# Patient Record
Sex: MALE
[De-identification: names, ages, dates, MRNs, and addresses within clinical notes are randomized per-mention and may not be internally consistent; named-entity substitution may affect disease eponyms.]

---

## 2019-10-17 ENCOUNTER — HOSPITAL ENCOUNTER (EMERGENCY)
Dept: HOSPITAL 97 - ER | Age: 34
Discharge: HOME | End: 2019-10-17
Payer: COMMERCIAL

## 2019-10-17 VITALS — DIASTOLIC BLOOD PRESSURE: 102 MMHG | TEMPERATURE: 98.2 F | OXYGEN SATURATION: 98 % | SYSTOLIC BLOOD PRESSURE: 143 MMHG

## 2019-10-17 DIAGNOSIS — J40: Primary | ICD-10-CM

## 2019-10-17 PROCEDURE — 99284 EMERGENCY DEPT VISIT MOD MDM: CPT

## 2019-10-17 PROCEDURE — 71046 X-RAY EXAM CHEST 2 VIEWS: CPT

## 2019-10-17 PROCEDURE — 94640 AIRWAY INHALATION TREATMENT: CPT

## 2019-10-17 NOTE — RAD REPORT
EXAM DESCRIPTION:  RAD - Chest Pa And Lat (2 Views) - 10/17/2019 11:10 am

 

CLINICAL HISTORY:  Cough;Congestion, fever

 

COMPARISON:  None.

 

TECHNIQUE:  PA and lateral views of the chest were obtained.

 

FINDINGS:  The lungs are clear of focal infiltrate. Perihilar markings are not outside of range of no
rmal.   Heart size is normal and central vasculature is within normal limits.  No pleural effusion or
 pneumothorax seen.  No acute bony finding noted.  No aortic abnormality.

 

IMPRESSION:  No acute cardiopulmonary process.

## 2019-10-17 NOTE — ER
Nurse's Notes                                                                                     

 [facility]                                                                 

Name: Melany Holman                                                                             

Age: 34 yrs                                                                                       

Sex: Male                                                                                         

: 1985                                                                                   

MRN: [id number]                                                                                   

Arrival Date: 10/17/2019                                                                          

Time: 09:32                                                                                       

Account#: [id number]                                                                            

Bed 15                                                                                            

Private MD:                                                                                       

Diagnosis: Bronchitis, not specified as acute or chronic                                          

                                                                                                  

Presentation:                                                                                     

10/17                                                                                             

09:46 Presenting complaint: Patient states: chest congestion and tightness X 2 days, fever at   

      home on Oct 4, was seen at doctor and prescribed abx for bronchitis, f/u with doctor at     

      clinic yesterday, doctor heard something on left side of chest was told he needed CXR,      

      dry cough. Transition of care: patient was not received from another setting of care.       

      Onset of symptoms was 2019. Risk Assessment: Do you want to hurt yourself       

      or someone else? Patient reports no desire to harm self or others. Initial Sepsis           

      Screen: Does the patient meet any 2 criteria? No. Patient's initial sepsis screen is        

      negative. Does the patient have a suspected source of infection? No. Patient's initial      

      sepsis screen is negative. Care prior to arrival: None.                                     

09:46 Method Of Arrival: Ambulatory                                                             

09:46 Acuity: NADYA 3                                                                           iw  

                                                                                                  

Historical:                                                                                       

- Allergies:                                                                                      

09:50 No Known Allergies;                                                                     iw  

- Home Meds:                                                                                      

09:50 None [Active];                                                                          iw  

- PMHx:                                                                                           

09:50 None;                                                                                   iw  

- PSHx:                                                                                           

09:50 None;                                                                                   iw  

                                                                                                  

- Immunization history:: Adult Immunizations not up to date.                                      

- Social history:: Smoking status: Patient/guardian denies using tobacco.                         

- Ebola Screening: : Patient negative for fever greater than or equal to 101.5 degrees            

  Fahrenheit, and additional compatible Ebola Virus Disease symptoms Patient denies               

  exposure to infectious person Patient denies travel to an Ebola-affected area in the            

  21 days before illness onset No symptoms or risks identified at this time.                      

                                                                                                  

                                                                                                  

Screening:                                                                                        

10:00 Abuse screen: Denies threats or abuse. Denies injuries from another. Nutritional        ph  

      screening: No deficits noted. Tuberculosis screening: No symptoms or risk factors           

      identified. Fall Risk None identified.                                                      

                                                                                                  

Assessment:                                                                                       

10:00 General: Appears in no apparent distress. comfortable, well groomed, Behavior is calm,  ph  

      cooperative, appropriate for age, Denies fever. Pain: Denies pain. Neuro: Level of          

      Consciousness is awake, alert, obeys commands, Oriented to person, place, time,             

      situation. Cardiovascular: Capillary refill < 3 seconds in bilateral fingers Patient's      

      skin is warm and dry. Rhythm is regular. Respiratory: Reports cough that is Airway is       

      patent Respiratory effort is even, unlabored, Respiratory pattern is regular,               

      symmetrical, Breath sounds are coarse in left posterior lower lobe. GI: No signs and/or     

      symptoms were reported involving the gastrointestinal system. Derm: Skin is intact, is      

      healthy with good turgor, Skin is pink, warm \T\ dry. Musculoskeletal: Circulation,         

      motion, and sensation intact. Range of motion: intact in all extremities.                   

12:08 Reassessment: Patient appears in no apparent distress at this time. Patient and/or      ph  

      family updated on plan of care and expected duration. Pain level reassessed. Patient is     

      alert, oriented x 3, equal unlabored respirations, skin warm/dry/pink. Pt d/c home.         

                                                                                                  

Vital Signs:                                                                                      

09:50  / 102; Pulse 100; Resp 18; Temp 98.2(O); Pulse Ox 98% on R/A; Weight 72.57 kg;   iw  

      Height 5 ft. 7 in. (170.18 cm);                                                             

11:30  / 89; Pulse 87; Resp 18; Temp 97.9; Pulse Ox 99% on R/A;                         ph  

09:50 Body Mass Index 25.06 (72.57 kg, 170.18 cm)                                             iw  

                                                                                                  

ED Course:                                                                                        

09:32 Patient arrived in ED.                                                                  mr  

09:41 Luz Kwok[facility]                                                        kb  

09:41 Xavi Mcpherson MD is Attending Physician.                                              kb  

09:50 Triage completed.                                                                       iw  

09:50 Arm band placed on.                                                                     iw  

09:55 Rhonda Orozco, RN is Primary Nurse.                                                    ph  

10:30 Patient has correct armband on for positive identification. Placed in gown. Bed in low  ph  

      position. Call light in reach. Side rails up X 1. Pulse ox on. NIBP on. Door closed.        

      Noise minimized. Warm blanket given.                                                        

11:10 Chest Pa And Lat (2 Views) XRAY In Process Unspecified.                                 EDMS

12:11 No provider procedures requiring assistance completed. Patient did not have IV access   ph  

      during this emergency room visit.                                                           

                                                                                                  

Administered Medications:                                                                         

10:23 Drug: DuoNeb (3:1) (2.5 mg - 0.5 mg) 3 ml Route: Nebulizer;                             ph  

10:47 Follow up: Response: No adverse reaction                                                ph  

                                                                                                  

                                                                                                  

Outcome:                                                                                          

11:54 Discharge ordered by MD.                                                                jet  

12:11 Discharged to home ambulatory, with significant other.                                  ph  

12:11 Condition: good                                                                             

12:11 Discharge instructions given to patient, Instructed on discharge instructions, follow       

      up and referral plans. Demonstrated understanding of instructions, follow-up care.          

12:12 Patient left the ED.                                                                    ph  

                                                                                                  

Signatures:                                                                                       

Dispatcher [employer]                           EDLuz Jacobs, JESENIA KAPLAN-Lakeisha Langford Irene, RN                     RN   iw                                                   

Rhonda Orozco RN                      RN   ph                                                   

                                                                                                  

**************************************************************************************************

## 2019-10-17 NOTE — EDPHYS
Physician Documentation                                                                           

 [facility]                                                                 

Name: Melany Holman                                                                             

Age: 34 yrs                                                                                       

Sex: Male                                                                                         

: 1985                                                                                   

MRN: [id number]                                                                                   

Arrival Date: 10/17/2019                                                                          

Time: 09:32                                                                                       

Account#: [id number]                                                                            

Bed 15                                                                                            

Private MD:                                                                                       

ED Physician Xavi Mcpherson                                                                       

HPI:                                                                                              

10/17                                                                                             

11:33 This 34 yrs old  Male presents to ER via Ambulatory with complaints of cough.   kb  

11:33 The patient or guardian reports cough, that is intermittent, described as mild, with no kb  

      sputum, flu symptoms, low-grade fever, myalgias. Onset: The symptoms/episode                

      began/occurred 2 week(s) ago. Severity of symptoms: At their worst the symptoms were        

      moderate, in the emergency department the symptoms have improved, moderately. Modifying     

      factors: The symptoms are alleviated by nothing, the symptoms are aggravated by             

      nothing. Associated signs and symptoms: The patient has no apparent associated signs or     

      symptoms. The patient has not experienced similar symptoms in the past. The patient has     

      not recently seen a physician. Pt reports he started with cough, congestion and fever       

      on 10/3/19. Went to the clinic on the third day of symptoms and was given 10 day course     

      of augmentin. States he finished that yesterday and went back to the clinic for follow      

      up. The dr that he saw yesterday told him he needed to come for a chest x-ray because       

      she heard some bacteria in his left lung.                                                   

                                                                                                  

Historical:                                                                                       

- Allergies:                                                                                      

09:50 No Known Allergies;                                                                     iw  

- Home Meds:                                                                                      

09:50 None [Active];                                                                          iw  

- PMHx:                                                                                           

09:50 None;                                                                                   iw  

- PSHx:                                                                                           

09:50 None;                                                                                   iw  

                                                                                                  

- Immunization history:: Adult Immunizations not up to date.                                      

- Social history:: Smoking status: Patient/guardian denies using tobacco.                         

- Ebola Screening: : Patient negative for fever greater than or equal to 101.5 degrees            

  Fahrenheit, and additional compatible Ebola Virus Disease symptoms Patient denies               

  exposure to infectious person Patient denies travel to an Ebola-affected area in the            

  21 days before illness onset No symptoms or risks identified at this time.                      

                                                                                                  

                                                                                                  

ROS:                                                                                              

11:32 Constitutional: Negative for fever, chills, and weight loss, ENT: Negative for injury,  kb  

      pain, and discharge, Neck: Negative for injury, pain, and swelling, Cardiovascular:         

      Negative for chest pain, palpitations, and edema, Abdomen/GI: Negative for abdominal        

      pain, nausea, vomiting, diarrhea, and constipation, Back: Negative for injury and pain,     

      : Negative for injury, bleeding, discharge, and swelling, MS/Extremity: Negative for      

      injury and deformity, Skin: Negative for injury, rash, and discoloration, Neuro:            

      Negative for headache, weakness, numbness, tingling, and seizure.                           

11:32 Respiratory: Positive for cough, Negative for dyspnea on exertion, hemoptysis,              

      orthopnea, pleurisy, shortness of breath, sputum production, wheezing.                      

                                                                                                  

Exam:                                                                                             

11:32 Constitutional:  This is a well developed, well nourished patient who is awake, alert,  kb  

      and in no acute distress. Head/Face:  Normocephalic, atraumatic. ENT:  Nares patent. No     

      nasal discharge, no septal abnormalities noted.  Tympanic membranes are normal and          

      external auditory canals are clear.  Oropharynx with no redness, swelling, or masses,       

      exudates, or evidence of obstruction, uvula midline.  Mucous membranes moist. Neck:         

      Trachea midline, no thyromegaly or masses palpated, and no cervical lymphadenopathy.        

      Supple, full range of motion without nuchal rigidity, or vertebral point tenderness.        

      No Meningismus. Chest/axilla:  Normal chest wall appearance and motion.  Nontender with     

      no deformity.  No lesions are appreciated. Cardiovascular:  Regular rate and rhythm         

      with a normal S1 and S2.  No gallops, murmurs, or rubs.  Normal PMI, no JVD.  No pulse      

      deficits. Abdomen/GI:  Soft, non-tender, with normal bowel sounds.  No distension or        

      tympany.  No guarding or rebound.  No evidence of tenderness throughout. Back:  No          

      spinal tenderness.  No costovertebral tenderness.  Full range of motion. Skin:  Warm,       

      dry with normal turgor.  Normal color with no rashes, no lesions, and no evidence of        

      cellulitis. MS/ Extremity:  Pulses equal, no cyanosis.  Neurovascular intact.  Full,        

      normal range of motion. Neuro:  Awake and alert, GCS 15, oriented to person, place,         

      time, and situation.  Cranial nerves II-XII grossly intact.  Motor strength 5/5 in all      

      extremities.  Sensory grossly intact.  Cerebellar exam normal.  Normal gait.                

11:32 Respiratory: the patient does not display signs of respiratory distress,  Respirations:     

      normal, Breath sounds: rhonchi, that are mild, are heard in the  left lower lobe and        

      left posterior lower lobe.                                                                  

                                                                                                  

Vital Signs:                                                                                      

09:50  / 102; Pulse 100; Resp 18; Temp 98.2(O); Pulse Ox 98% on R/A; Weight 72.57 kg;   iw  

      Height 5 ft. 7 in. (170.18 cm);                                                             

11:30  / 89; Pulse 87; Resp 18; Temp 97.9; Pulse Ox 99% on R/A;                         ph  

09:50 Body Mass Index 25.06 (72.57 kg, 170.18 cm)                                             iw  

                                                                                                  

MDM:                                                                                              

09:50 Patient medically screened.                                                             kb  

11:32 Data reviewed: vital signs, nurses notes. Data interpreted: Pulse oximetry: on room air kb  

      is 98 %. Interpretation: normal. Test interpretation: by ED physician or midlevel           

      provider: plain radiologic studies, neg .                                                   

11:54 Counseling: I had a detailed discussion with the patient and/or guardian regarding: the kb  

      historical points, exam findings, and any diagnostic results supporting the                 

      discharge/admit diagnosis, radiology results, the need for outpatient follow up, a          

      family practitioner, to return to the emergency department if symptoms worsen or            

      persist or if there are any questions or concerns that arise at home.                       

                                                                                                  

10/17                                                                                             

10:09 Order name: Chest Pa And Lat (2 Views) XRAY; Complete Time: 11:55                       kb  

                                                                                                  

Administered Medications:                                                                         

10:23 Drug: DuoNeb (3:1) (2.5 mg - 0.5 mg) 3 ml Route: Nebulizer;                             ph  

10:47 Follow up: Response: No adverse reaction                                                ph  

                                                                                                  

                                                                                                  

Disposition:                                                                                      

10/17/19 11:54 Discharged to Home. Impression: Bronchitis, not specified as acute or chronic.     

- Condition is Stable.                                                                            

- Discharge Instructions: Acute Bronchitis, Easy-to-Read, Viral Respiratory Infection,            

  Easy-To-Read.                                                                                   

- Prescriptions for Albuterol Sulfate 90 mcg/actuation - inhale 1-2 puff by INHALATION            

  route every 4-6 hours; 1 Inhaler.                                                               

- Medication Reconciliation Form, Thank You Letter, Antibiotic Education, Prescription            

  Opioid Use, Work release form form.                                                             

- Follow up: Private Physician; When: 2 - 3 days; Reason: Recheck today's complaints,             

  Continuance of care, Re-evaluation by your physician. Follow up: Emergency                      

  Department; When: As needed.                                                                    

                                                                                                  

                                                                                                  

                                                                                                  

Addendum:                                                                                         

10/18/2019                                                                                        

     15:28 Co-signature as Attending Physician, Xavi Mcpherson MD.                                  g
s

                                                                                                  

Signatures:                                                                                       

Dispatcher [employer]                           EDLuz Jacobs, FNP-C                 FNP-Ckb                                                   

Kirti Fontenot, RN                     RN   iw                                                   

Rhonda Orozco RN                      RN   slim Mcpherson, MD HAZEL Hurley                                                      

                                                                                                  

Corrections: (The following items were deleted from the chart)                                    

10/17                                                                                             

12:12 11:54 10/17/2019 11:54 Discharged to Home. Impression: Bronchitis, not specified as     ph  

      acute or chronic. Condition is Stable. Forms are Medication Reconciliation Form, Thank      

      You Letter, Antibiotic Education, Prescription Opioid Use. Follow up: Private               

      Physician; When: 2 - 3 days; Reason: Recheck today's complaints, Continuance of care,       

      Re-evaluation by your physician. Follow up: Emergency Department; When: As needed. kb       

                                                                                                  

**************************************************************************************************

## 2020-05-25 ENCOUNTER — HOSPITAL ENCOUNTER (EMERGENCY)
Dept: HOSPITAL 97 - ER | Age: 35
Discharge: HOME | End: 2020-05-25
Payer: COMMERCIAL

## 2020-05-25 VITALS — SYSTOLIC BLOOD PRESSURE: 131 MMHG | OXYGEN SATURATION: 98 % | DIASTOLIC BLOOD PRESSURE: 88 MMHG

## 2020-05-25 VITALS — TEMPERATURE: 99.1 F

## 2020-05-25 DIAGNOSIS — R07.9: Primary | ICD-10-CM

## 2020-05-25 LAB
ALBUMIN SERPL BCP-MCNC: 3.9 G/DL (ref 3.4–5)
ALP SERPL-CCNC: 77 U/L (ref 45–117)
ALT SERPL W P-5'-P-CCNC: 44 U/L (ref 12–78)
AST SERPL W P-5'-P-CCNC: 18 U/L (ref 15–37)
BUN BLD-MCNC: 12 MG/DL (ref 7–18)
GLUCOSE SERPLBLD-MCNC: 108 MG/DL (ref 74–106)
HCT VFR BLD CALC: 51.4 % (ref 39.6–49)
INR BLD: 1.03
LYMPHOCYTES # SPEC AUTO: 1.9 K/UL (ref 0.7–4.9)
MAGNESIUM SERPL-MCNC: 2.2 MG/DL (ref 1.8–2.4)
NT-PROBNP SERPL-MCNC: 5 PG/ML (ref ?–125)
PMV BLD: 10 FL (ref 7.6–11.3)
POTASSIUM SERPL-SCNC: 4 MMOL/L (ref 3.5–5.1)
RBC # BLD: 5.69 M/UL (ref 4.33–5.43)
TROPONIN (EMERG DEPT USE ONLY): < 0.02 NG/ML (ref 0–0.04)

## 2020-05-25 PROCEDURE — 83880 ASSAY OF NATRIURETIC PEPTIDE: CPT

## 2020-05-25 PROCEDURE — 85025 COMPLETE CBC W/AUTO DIFF WBC: CPT

## 2020-05-25 PROCEDURE — 80048 BASIC METABOLIC PNL TOTAL CA: CPT

## 2020-05-25 PROCEDURE — 80076 HEPATIC FUNCTION PANEL: CPT

## 2020-05-25 PROCEDURE — 36415 COLL VENOUS BLD VENIPUNCTURE: CPT

## 2020-05-25 PROCEDURE — 85610 PROTHROMBIN TIME: CPT

## 2020-05-25 PROCEDURE — 84484 ASSAY OF TROPONIN QUANT: CPT

## 2020-05-25 PROCEDURE — 99285 EMERGENCY DEPT VISIT HI MDM: CPT

## 2020-05-25 PROCEDURE — 71045 X-RAY EXAM CHEST 1 VIEW: CPT

## 2020-05-25 PROCEDURE — 93005 ELECTROCARDIOGRAM TRACING: CPT

## 2020-05-25 PROCEDURE — 83735 ASSAY OF MAGNESIUM: CPT

## 2020-05-25 NOTE — RAD REPORT
EXAM DESCRIPTION:  Bharath Single View5/25/2020 11:21 am

 

CLINICAL HISTORY:  Chest pain

 

COMPARISON:  2019

 

FINDINGS:   The lungs appear clear of acute infiltrate. The heart is normal size

 

IMPRESSION:   No acute abnormalities displayed

## 2020-05-26 NOTE — EKG
Test Date:    2020-05-25               Test Time:    11:08:01

Technician:   REINIER                                    

                                                     

MEASUREMENT RESULTS:                                       

Intervals:                                           

Rate:         69                                     

KS:           138                                    

QRSD:         86                                     

QT:           362                                    

QTc:          387                                    

Axis:                                                

P:            51                                     

KS:           138                                    

QRS:          50                                     

T:            23                                     

                                                     

INTERPRETIVE STATEMENTS:                                       

                                                     

Normal sinus rhythm with sinus arrhythmia

Nonspecific T wave abnormality

Abnormal ECG

No previous ECG available for comparison



Electronically Signed On 05-26-20 07:08:07 CDT by Nelson Mohan

## 2020-06-01 NOTE — EDPHYS
Physician Documentation                                                                           

 [facility]                                                                 

Name: Melany Holman                                                                             

Age: 34 yrs                                                                                       

Sex: Male                                                                                         

: 1985                                                                                   

MRN: [id number]                                                                                   

Arrival Date: 2020                                                                          

Time: 10:36                                                                                       

Account#: [id number]                                                                            

Bed 16                                                                                            

Private MD:                                                                                       

ED Physician Kenney Mackay                                                                         

HPI:                                                                                              

                                                                                             

11:03 This 34 yrs old  Male presents to ER via Ambulatory with complaints of Chest    pm1 

      Pain, Back Pain, Numbness Of Arm.                                                           

11:03 The patient or guardian reports chest pain that is located primarily in the left        pm1 

      clavicle and anterior aspect of left upper chest. The pain radiates to the left arm,        

      left back. Associated signs and symptoms: Pertinent positives: shortness of breath,         

      Pertinent negatives: abdominal pain, cough, nausea, vomiting. The chest pain is             

      described as sharp. Duration: The patient or guardian reports a single episode, that is     

      now resolved. Severity of pain: in the emergency department the pain has resolved. The      

      patient has not experienced similar symptoms in the past. onset 3-4 days ago.               

                                                                                                  

Historical:                                                                                       

- Allergies:                                                                                      

10:50 No Known Allergies;                                                                     aa5 

- PMHx:                                                                                           

10:50 None;                                                                                   aa5 

- PSHx:                                                                                           

10:50 lasik eye sx;                                                                           aa5 

                                                                                                  

- Immunization history:: Adult Immunizations unknown.                                             

- Social history:: Smoking status: unknown.                                                       

                                                                                                  

                                                                                                  

ROS:                                                                                              

11:03 Constitutional: Negative for fever, chills, and weight loss.                            pm1 

11:03 Abdomen/GI: Negative for abdominal pain, nausea, vomiting, diarrhea, and constipation,      

      MS/Extremity: Negative for injury and deformity, Skin: Negative for injury, rash, and       

      discoloration.                                                                              

11:03 Neuro: Negative for headache, weakness, numbness, tingling, and seizure.                    

11:03 Cardiovascular: Positive for chest pain, Negative for edema, palpitations.                  

11:03 Respiratory: Positive for shortness of breath, Negative for cough, wheezing.                

11:03 Back: Positive for pain left trapezius.                                                     

                                                                                                  

Exam:                                                                                             

11:03 Constitutional:  This is a well developed, well nourished patient who is awake, alert,  pm1 

      and in no acute distress. Head/Face:  Normocephalic, atraumatic. Neck:  Trachea             

      midline, no thyromegaly or masses palpated, and no cervical lymphadenopathy.  Supple,       

      full range of motion without nuchal rigidity, or vertebral point tenderness.  No            

      Meningismus. Chest/axilla:  Normal chest wall appearance and motion.  Nontender with no     

      deformity.  No lesions are appreciated. Cardiovascular:  Regular rate and rhythm with a     

      normal S1 and S2.  No gallops, murmurs, or rubs.  Normal PMI, no JVD.  No pulse             

      deficits. Respiratory:  Lungs have equal breath sounds bilaterally, clear to                

      auscultation and percussion.  No rales, rhonchi or wheezes noted.  No increased work of     

      breathing, no retractions or nasal flaring. Abdomen/GI:  Soft, non-tender, with normal      

      bowel sounds.  No distension or tympany.  No guarding or rebound.  No evidence of           

      tenderness throughout. Back:  No spinal tenderness.  No costovertebral tenderness.          

      Full range of motion. Skin:  Warm, dry with normal turgor.  Normal color with no            

      rashes, no lesions, and no evidence of cellulitis. MS/ Extremity:  Pulses equal, no         

      cyanosis.  Neurovascular intact.  Full, normal range of motion.                             

11:03 Neuro: Exam negative for acute changes, Orientation: is normal, Mentation: is normal,       

      Motor: is normal, moves all fours.                                                          

                                                                                                  

Vital Signs:                                                                                      

10:50  / 87; Pulse 73; Resp 16 S; Temp 99.1(O); Pulse Ox 100% on R/A; Pain 0/10;        aa5 

12:06  / 85; Pulse 75; Resp 18; Pulse Ox 99% ;                                          bp  

13:07  / 88; Pulse 72; Resp 15; Pulse Ox 98% ;                                          bp  

                                                                                                  

MDM:                                                                                              

10:50 Patient medically screened.                                                             pm1 

12:30 Data reviewed: vital signs. Data interpreted: Pulse oximetry: on room air is 99 %.      pm1 

      Interpretation: normal. Counseling: I had a detailed discussion with the patient and/or     

      guardian regarding: the historical points, exam findings, and any diagnostic results        

      supporting the discharge/admit diagnosis, lab results, radiology results, the need for      

      outpatient follow up, to return to the emergency department if symptoms worsen or           

      persist or if there are any questions or concerns that arise at home.                       

                                                                                                  

                                                                                             

10:58 Order name: Basic Metabolic Panel; Complete Time: 11:53                                 pm1 

                                                                                             

10:58 Order name: CBC with Diff; Complete Time: 11:38                                         pm1 

                                                                                             

10:58 Order name: LFT's; Complete Time: 11:53                                                 pm                                                                                             

10:58 Order name: Magnesium; Complete Time: 11:53                                             pm                                                                                             

10:58 Order name: NT PRO-BNP; Complete Time: 11:53                                            pm                                                                                             

10:58 Order name: PT-INR; Complete Time: 11:52                                                pm                                                                                             

10:58 Order name: Troponin (emerg Dept Use Only); Complete Time: 11:53                        pm                                                                                             

10:58 Order name: XRAY Chest (1 view); Complete Time: 11:52                                   pm                                                                                             

10:58 Order name: EKG; Complete Time: 11:01                                                   pm                                                                                             

10:58 Order name: Cardiac monitoring; Complete Time: 11:04                                    pm                                                                                             

10:58 Order name: EKG - Nurse/Tech; Complete Time: 11:                                      pm                                                                                             

10:58 Order name: IV Saline Lock; Complete Time: 11:25                                        pm                                                                                             

10:58 Order name: Labs collected and sent; Complete Time: 11:                               pm                                                                                             

10:58 Order name: O2 Per Protocol; Complete Time: 11:10                                       pm                                                                                             

10:58 Order name: O2 Sat Monitoring; Complete Time: 11:10                                     pm 

                                                                                                  

Administered Medications:                                                                         

No medications were administered                                                                  

                                                                                                  

                                                                                                  

Disposition:                                                                                      

18:09 Co-signature as Attending Physician, Kenney Mackay MD.                                    rn  

                                                                                                  

Disposition:                                                                                      

20 12:32 Discharged to Home. Impression: Chest pain, unspecified.                           

- Condition is Stable.                                                                            

- Discharge Instructions: Nonspecific Chest Pain.                                                 

                                                                                                  

- Medication Reconciliation Form, Thank You Letter, Antibiotic Education, Prescription            

  Opioid Use form.                                                                                

- Follow up: Emergency Department; When: As needed; Reason: Worsening of condition.               

  Follow up: Private Physician; When: 2 - 3 days; Reason: Recheck today's complaints,             

  Continuance of care, Re-evaluation by your physician.                                           

- Problem is new.                                                                                 

- Symptoms have improved.                                                                         

                                                                                                  

                                                                                                  

                                                                                                  

Signatures:                                                                                       

Dispatcher [employer]                           EDMS                                                 

Kenney Mackay MD MD rn Calderon, Audri RN                     RN   aa5                                                  

Howie Finch, ARIAS                    NP   pm1                                                  

Julio C Ahumada RN                      RN   bp                                                   

                                                                                                  

Corrections: (The following items were deleted from the chart)                                    

13:11 12:32 2020 12:32 Discharged to Home. Impression: Chest pain, unspecified.         bp  

      Condition is Stable. Forms are Medication Reconciliation Form, Thank You Letter,            

      Antibiotic Education, Prescription Opioid Use. Follow up: Emergency Department; When:       

      As needed; Reason: Worsening of condition. Follow up: Private Physician; When: 2 - 3        

      days; Reason: Recheck today's complaints, Continuance of care, Re-evaluation by your        

      physician. Problem is new. Symptoms have improved. pm1                                      

                                                                                                  

**************************************************************************************************

## 2020-06-01 NOTE — ER
Nurse's Notes                                                                                     

 [facility]                                                                 

Name: Melany Holman                                                                             

Age: 34 yrs                                                                                       

Sex: Male                                                                                         

: 1985                                                                                   

MRN: [id number]                                                                                   

Arrival Date: 2020                                                                          

Time: 10:36                                                                                       

Account#: [id number]                                                                            

Bed 16                                                                                            

Private MD:                                                                                       

Diagnosis: Chest pain, unspecified                                                                

                                                                                                  

Presentation:                                                                                     

                                                                                             

10:50 Chief complaint: Patient states: left-sided chest pain, left arm tingling and pain      aa5 

      radiating to upper back. Pt reports mild SOB. Denies cough, denies fever. Pt reports        

      symptoms began 3-4 days ago.                                                                

10:50 Coronavirus screen: Patient denies a cough. Patient reports shortness of breath or      aa5 

      difficulty breathing. Patient denies measured and/or subjective temperature greater         

      than 100.4F prior to today's visit. Patient denies travel on a cruise ship or to a          

      country the [facility] currently lists as an affected area. Patient denies contact with known      

      and/or suspected case of COVID-19. Ebola Screen: Patient negative for fever greater         

      than or equal to 101.5 degrees Fahrenheit, and additional compatible Ebola Virus            

      Disease symptoms. Initial Sepsis Screen: Does the patient meet any 2 criteria? No.          

      Patient's initial sepsis screen is negative. Does the patient have a suspected source       

      of infection? No. Patient's initial sepsis screen is negative. Risk Assessment: Do you      

      want to hurt yourself or someone else? Patient reports no desire to harm self or            

      others. Onset of symptoms was May 2020.                                                     

10:50 Acuity: NADYA 3                                                                           aa5 

10:50 Method Of Arrival: Ambulatory                                                           aa5 

                                                                                                  

Triage Assessment:                                                                                

10:50 General: Appears in no apparent distress. comfortable, Behavior is cooperative,         bp  

      appropriate for age, anxious. Pain: Complains of pain in chest. EENT: No deficits           

      noted. Neuro: No deficits noted. Cardiovascular: No deficits noted. Respiratory: No         

      deficits noted. GI: No signs and/or symptoms were reported involving the                    

      gastrointestinal system. : No signs and/or symptoms were reported regarding the           

      genitourinary system. Derm: No deficits noted. Musculoskeletal: No deficits noted.          

                                                                                                  

Historical:                                                                                       

- Allergies:                                                                                      

10:50 No Known Allergies;                                                                     aa5 

- PMHx:                                                                                           

10:50 None;                                                                                   aa5 

- PSHx:                                                                                           

10:50 lasik eye sx;                                                                           aa5 

                                                                                                  

- Immunization history:: Adult Immunizations unknown.                                             

- Social history:: Smoking status: unknown.                                                       

                                                                                                  

                                                                                                  

Screenin:54 Abuse screen: Denies threats or abuse. Denies injuries from another. Nutritional        bp  

      screening: No deficits noted. Tuberculosis screening: No symptoms or risk factors           

      identified. Fall Risk None identified.                                                      

                                                                                                  

Assessment:                                                                                       

10:50 General: SEE TRIAGE NOTE.                                                               bp  

11:50 Reassessment: ALL CURRENT ORDERS COMPLETED, EKG UNREMARKABLE.                           bp  

13:08 Reassessment: PT D/C HOME AMBULATORY, DX WITH NONSPECIFIC CHEST PAIN.                   bp  

                                                                                                  

Vital Signs:                                                                                      

10:50  / 87; Pulse 73; Resp 16 S; Temp 99.1(O); Pulse Ox 100% on R/A; Pain 0/10;        aa5 

12:06  / 85; Pulse 75; Resp 18; Pulse Ox 99% ;                                          bp  

13:07  / 88; Pulse 72; Resp 15; Pulse Ox 98% ;                                          bp  

                                                                                                  

ED Course:                                                                                        

10:36 Patient arrived in ED.                                                                  ag5 

10:50 Howie Finch NP is PHCP.                                                           pm1 

10:50 Kenney Mackay MD is Attending Physician.                                                pm1 

10:50 Arm band placed on Patient placed in an exam room, on a stretcher.                      aa5 

10:59 Triage completed.                                                                       aa5 

11:00 Cardiac monitor on. Pulse ox on. NIBP on.                                               bp  

11:00 Patient maintains SpO2 saturation greater than 95% on room air.                         bp  

11:03 Julio C Ahumada, RN is Primary Nurse.                                                    bp  

11:20 Inserted saline lock: 20 gauge in left forearm, using aseptic technique. Blood          bp  

      collected.                                                                                  

11:24 XRAY Chest (1 view) In Process Unspecified.                                             EDMS

11:54 Patient has correct armband on for positive identification. Bed in low position. Call   bp  

      light in reach. Side rails up X2.                                                           

13:09 No provider procedures requiring assistance completed. IV discontinued, intact,         bp  

      bleeding controlled, No redness/swelling at site. Pressure dressing applied.                

                                                                                                  

Administered Medications:                                                                         

No medications were administered                                                                  

                                                                                                  

                                                                                                  

Outcome:                                                                                          

12:32 Discharge ordered by MD.                                                                pm1 

13:09 Discharged to home                                                                      bp  

13:09 Condition: stable                                                                           

13:09 Discharge instructions given to patient, Instructed on discharge instructions, follow       

      up and referral plans. Demonstrated understanding of instructions, follow-up care.          

13:11 Patient left the ED.                                                                    bp  

                                                                                                  

Signatures:                                                                                       

Dispatcher [facility]                           EDZamzam Nelson RN RN   aa5                                                  

Marinas, Howie, NP                    NP   pm1                                                  

Julio C Ahumada, SADE                      RN   bp                                                   

Gary, Louis                                ag5                                                  

                                                                                                  

**************************************************************************************************